# Patient Record
Sex: FEMALE | Race: WHITE
[De-identification: names, ages, dates, MRNs, and addresses within clinical notes are randomized per-mention and may not be internally consistent; named-entity substitution may affect disease eponyms.]

---

## 2017-06-05 ENCOUNTER — HOSPITAL ENCOUNTER (OUTPATIENT)
Dept: HOSPITAL 47 - RADMAMWWP | Age: 72
Discharge: HOME | End: 2017-06-05
Payer: MEDICARE

## 2017-06-05 DIAGNOSIS — Z12.31: Primary | ICD-10-CM

## 2017-06-05 DIAGNOSIS — Z80.3: ICD-10-CM

## 2017-06-05 PROCEDURE — 77063 BREAST TOMOSYNTHESIS BI: CPT

## 2017-06-05 NOTE — MM
Reason for exam: screening  (asymptomatic).

Last mammogram was performed 1 year ago.



History:

Patient is postmenopausal and is nulliparous.

Family history of premenopausal breast cancer in maternal aunt at age 39.

Benign left mammotome panel of the left breast, May 22, 2013.

Took estrogen for 10 years.



Physical Findings:

A clinical breast exam by your physician is recommended on an annual basis and 

results should be correlated with mammographic findings.



MG 3D Screening Mammo W/Cad

Bilateral CC and MLO view(s) were taken.

Prior study comparison: May 27, 2016, bilateral MG screening mammo w CAD.  May 26,

2015, bilateral MG screening mammo w CAD.  May 23, 2014, bilateral MG diagnostic 

mammo w CAD ILDA.

There are scattered fibroglandular densities.

Finding: There are typically benign round calcifications in both breasts. Previous

mammotome biopsy in the left breast. Asymmetric breast tissue left upper outer 

quadrant, stable. There is no discrete abnormality.





ASSESSMENT: Benign, BI-RAD 2



RECOMMENDATION:

Routine screening mammogram of both breasts in 1 year.

## 2018-05-20 ENCOUNTER — HOSPITAL ENCOUNTER (INPATIENT)
Dept: HOSPITAL 47 - EC | Age: 73
LOS: 3 days | Discharge: HOME | DRG: 247 | End: 2018-05-23
Payer: MEDICARE

## 2018-05-20 VITALS — BODY MASS INDEX: 37.1 KG/M2

## 2018-05-20 DIAGNOSIS — R00.1: ICD-10-CM

## 2018-05-20 DIAGNOSIS — I25.10: ICD-10-CM

## 2018-05-20 DIAGNOSIS — Z90.710: ICD-10-CM

## 2018-05-20 DIAGNOSIS — Z87.891: ICD-10-CM

## 2018-05-20 DIAGNOSIS — E78.5: ICD-10-CM

## 2018-05-20 DIAGNOSIS — I10: ICD-10-CM

## 2018-05-20 DIAGNOSIS — Z79.899: ICD-10-CM

## 2018-05-20 DIAGNOSIS — E66.9: ICD-10-CM

## 2018-05-20 DIAGNOSIS — I95.9: ICD-10-CM

## 2018-05-20 DIAGNOSIS — Z79.82: ICD-10-CM

## 2018-05-20 DIAGNOSIS — I21.19: Primary | ICD-10-CM

## 2018-05-20 DIAGNOSIS — I47.2: ICD-10-CM

## 2018-05-20 DIAGNOSIS — Z88.8: ICD-10-CM

## 2018-05-20 DIAGNOSIS — Z90.49: ICD-10-CM

## 2018-05-20 LAB
ALBUMIN SERPL-MCNC: 4.3 G/DL (ref 3.5–5)
ALP SERPL-CCNC: 75 U/L (ref 38–126)
ALT SERPL-CCNC: 29 U/L (ref 9–52)
ANION GAP SERPL CALC-SCNC: 15 MMOL/L
APTT BLD: 21.3 SEC (ref 22–30)
AST SERPL-CCNC: 23 U/L (ref 14–36)
BUN SERPL-SCNC: 22 MG/DL (ref 7–17)
CALCIUM SPEC-MCNC: 9.7 MG/DL (ref 8.4–10.2)
CHLORIDE SERPL-SCNC: 102 MMOL/L (ref 98–107)
CHOLEST SERPL-MCNC: 198 MG/DL (ref ?–200)
CK SERPL-CCNC: 213 U/L (ref 30–135)
CK SERPL-CCNC: 266 U/L (ref 30–135)
CK SERPL-CCNC: 68 U/L (ref 30–135)
CO2 SERPL-SCNC: 22 MMOL/L (ref 22–30)
ERYTHROCYTE [DISTWIDTH] IN BLOOD BY AUTOMATED COUNT: 4.67 M/UL (ref 3.8–5.4)
ERYTHROCYTE [DISTWIDTH] IN BLOOD: 12.4 % (ref 11.5–15.5)
GLUCOSE BLD-MCNC: 123 MG/DL (ref 75–99)
GLUCOSE SERPL-MCNC: 110 MG/DL (ref 74–99)
HCT VFR BLD AUTO: 41 % (ref 34–46)
HDLC SERPL-MCNC: 37 MG/DL (ref 40–60)
HGB BLD-MCNC: 14.3 GM/DL (ref 11.4–16)
INR PPP: 1 (ref ?–1.2)
LDLC SERPL CALC-MCNC: 114 MG/DL (ref 0–99)
LIPASE SERPL-CCNC: 204 U/L (ref 23–300)
MAGNESIUM SPEC-SCNC: 2.1 MG/DL (ref 1.6–2.3)
MCH RBC QN AUTO: 30.7 PG (ref 25–35)
MCHC RBC AUTO-ENTMCNC: 35 G/DL (ref 31–37)
MCV RBC AUTO: 87.6 FL (ref 80–100)
PLATELET # BLD AUTO: 247 K/UL (ref 150–450)
POTASSIUM SERPL-SCNC: 3.8 MMOL/L (ref 3.5–5.1)
PROT SERPL-MCNC: 7 G/DL (ref 6.3–8.2)
PT BLD: 10.2 SEC (ref 9–12)
SODIUM SERPL-SCNC: 139 MMOL/L (ref 137–145)
TRIGL SERPL-MCNC: 235 MG/DL (ref ?–150)
TROPONIN I SERPL-MCNC: 5.22 NG/ML (ref 0–0.03)
TROPONIN I SERPL-MCNC: <0.012 NG/ML (ref 0–0.03)
WBC # BLD AUTO: 12 K/UL (ref 3.8–10.6)

## 2018-05-20 PROCEDURE — 99291 CRITICAL CARE FIRST HOUR: CPT

## 2018-05-20 PROCEDURE — 85025 COMPLETE CBC W/AUTO DIFF WBC: CPT

## 2018-05-20 PROCEDURE — 027035Z DILATION OF CORONARY ARTERY, ONE ARTERY WITH TWO DRUG-ELUTING INTRALUMINAL DEVICES, PERCUTANEOUS APPROACH: ICD-10-PCS

## 2018-05-20 PROCEDURE — 85027 COMPLETE CBC AUTOMATED: CPT

## 2018-05-20 PROCEDURE — 82550 ASSAY OF CK (CPK): CPT

## 2018-05-20 PROCEDURE — 96375 TX/PRO/DX INJ NEW DRUG ADDON: CPT

## 2018-05-20 PROCEDURE — 85049 AUTOMATED PLATELET COUNT: CPT

## 2018-05-20 PROCEDURE — 82553 CREATINE MB FRACTION: CPT

## 2018-05-20 PROCEDURE — 83880 ASSAY OF NATRIURETIC PEPTIDE: CPT

## 2018-05-20 PROCEDURE — B2111ZZ FLUOROSCOPY OF MULTIPLE CORONARY ARTERIES USING LOW OSMOLAR CONTRAST: ICD-10-PCS

## 2018-05-20 PROCEDURE — 84484 ASSAY OF TROPONIN QUANT: CPT

## 2018-05-20 PROCEDURE — 96374 THER/PROPH/DIAG INJ IV PUSH: CPT

## 2018-05-20 PROCEDURE — 85610 PROTHROMBIN TIME: CPT

## 2018-05-20 PROCEDURE — 84100 ASSAY OF PHOSPHORUS: CPT

## 2018-05-20 PROCEDURE — 80061 LIPID PANEL: CPT

## 2018-05-20 PROCEDURE — 83690 ASSAY OF LIPASE: CPT

## 2018-05-20 PROCEDURE — 83735 ASSAY OF MAGNESIUM: CPT

## 2018-05-20 PROCEDURE — 83036 HEMOGLOBIN GLYCOSYLATED A1C: CPT

## 2018-05-20 PROCEDURE — 02C03ZZ EXTIRPATION OF MATTER FROM CORONARY ARTERY, ONE ARTERY, PERCUTANEOUS APPROACH: ICD-10-PCS

## 2018-05-20 PROCEDURE — 80053 COMPREHEN METABOLIC PANEL: CPT

## 2018-05-20 PROCEDURE — 93306 TTE W/DOPPLER COMPLETE: CPT

## 2018-05-20 PROCEDURE — 71045 X-RAY EXAM CHEST 1 VIEW: CPT

## 2018-05-20 PROCEDURE — 85730 THROMBOPLASTIN TIME PARTIAL: CPT

## 2018-05-20 PROCEDURE — 93005 ELECTROCARDIOGRAM TRACING: CPT

## 2018-05-20 PROCEDURE — 80048 BASIC METABOLIC PNL TOTAL CA: CPT

## 2018-05-20 PROCEDURE — 4A023N7 MEASUREMENT OF CARDIAC SAMPLING AND PRESSURE, LEFT HEART, PERCUTANEOUS APPROACH: ICD-10-PCS

## 2018-05-20 PROCEDURE — 93458 L HRT ARTERY/VENTRICLE ANGIO: CPT

## 2018-05-20 PROCEDURE — 36415 COLL VENOUS BLD VENIPUNCTURE: CPT

## 2018-05-20 RX ADMIN — FENTANYL CITRATE ONE MCG: 50 INJECTION, SOLUTION INTRAMUSCULAR; INTRAVENOUS at 11:54

## 2018-05-20 RX ADMIN — ATORVASTATIN CALCIUM SCH MG: 80 TABLET, FILM COATED ORAL at 20:48

## 2018-05-20 RX ADMIN — FENTANYL CITRATE ONE MCG: 50 INJECTION, SOLUTION INTRAMUSCULAR; INTRAVENOUS at 11:26

## 2018-05-20 NOTE — HP
HISTORY AND PHYSICAL



CHIEF COMPLAINT:

This is a 72-year-old white female, status post PTCA to right coronary artery.  She

came in with positive non ST MI.  She was having chest pain at home for 6 hours prior

to admission and doing kitchen work and washing floors.  The pain recurred.  She was

admitted and taken to the heart catheterization lab and 2 stents were placed in right

coronary artery.  Started on Plavix, Lipitor, aspirin, heparin, Zestril, Lopressor,

Ambien for sleep.  On admission her MBs tori to 2.7 up to 31.1 with a total CK of 213

with a CK index of 14.6.  BNP at 198 and a troponin up to 5.2.  Echocardiogram is

ordered.  Risk factor modifications were given with the patient.



CARDIOVASCULAR: S1, S2.  Lungs are clear.  GI is distended due to obesity and BMI over

40.

PSYCH:  Fair mood and affect.  NEUROLOGIC:  Alert and oriented x3.



His HDL is 37, LDL is 114, triglycerides 235, total cholesterol 198.  Hemoglobin A1c

14.3.



REVIEW OF SYSTEM:

Fourteen point review of systems negative except for mentioned in HPI.



ASSESSMENT:

1. Non ST myocardial infarction.

2. Hypertension.

3. Obesity.

4. Dyslipidemia, rule out diabetes.

Do a hemoglobin A1c.  Continue current medications as mentioned above.  Standard postop

myocardial infarction care.  Forty-five minutes.





MMODL / IJN: [id number] / Job#: [id number]

## 2018-05-20 NOTE — P.PCN
Date of Procedure: 05/20/18


Preoperative Diagnosis: 


Acute inferior wall MI


Postoperative Diagnosis: 


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


Procedure(s) Performed: 


Left heart catheterization


Description of Procedure: 


HISTORY: This is a 72-year-old female with history of hypertension who lives by 

herself, was brought to the emergency room with chest pain of one hour duration 

and EKG evidence of inferior wall MI.  Patient is advised to have a cardiac 

cath with the intention of primary intervention.





CONSENT:I have discussed the risks, benefits and alternative therapies for the 

above-mentioned procedure and for both sedation/analgesia as well as necessary 

blood product administration, if indicated, as they pertain to this patient.  

The patient has indicated understanding and acceptance of the risks and 

procedures discussed.








PROCEDURE: Patient was brought to the lab in a fasting state.  Patient was 

given some IV sedation.  The right groin is infiltrated with lidocaine and 

right femoral artery was entered using Seldinger technique.  A 6-[place]er 

catheter was left in place and selective coronary arteriography and left 

ventriculography was performed.  Patient tolerated the procedure well.  Femoral 

angiogram was performed and Angio-Seal was applied for hemostasis.  No 

immediate complications were noted and patient was transferred to [facility] in a 

stable condition





Conscious Sedation: Versed 1mg


Fentanyl :50 g


Duration: 10minutes   


Hemodynamics: The aortic pressure is 112/70.  Left ventricle end-diastolic 

pressure was not measured





SELECTIVE CORONARY ARTERIOGRAPHY: 


                          LEFT MAIN: Normal length and patent


                          THE LEFT ANTERIOR DESCENDING CORONARY ARTERY: 

Moderate caliber vessel free of any significant occlusive disease


                          THE LEFT CHEMODRCUMFLEX AND IS CORONARY ARTERY: Small 

nondominant vessel free of occlusive disease


                          THE RIGHT CORONARY ARTERY: Dominant vessel with a 

diffuse disease in the proximal portion with total occlusion in the midportion.

  There are collaterals from the left to the right.





LEFT VENTRICULOGRAPHY: Not performed





FINAL IMPRESSION: Total occlusion of the RCA with acute inferior wall MI





PLAN: Stent placement of the RCA to be done by Dr. Daily





PROGNOSIS: Guarded

## 2018-05-20 NOTE — P.CRDCN
History of Present Illness


Consult date: 05/20/18


History of present illness: 


This is a 72-year-old female with history of hypertension being treated with 

losartan, was brought by EMS to the emergency room with complaints of 

precordial chest pain which started about an hour ago prior to the admission.  

Patient is a sweaty and feeling weak.  Her EKG showed evidence of ST elevation 

in inferior leads.  Patient is also having episodes of triplets and brief 

episodes of nonsustained V. tach.  Patient is advised to have cardiac 

catheterization with the intention of primary intervention.  Patient doesn't 

have any immediate family.  Apparently she lives by herself.  She has no 

history of previous myocardial infarctions.  Her ischemic heart disease.  No 

history of diabetes.  No history of strokes








Review of Systems





Not obtained





Past Medical History


Past Medical History: Hyperlipidemia, Hypertension


History of Any Multi-Drug Resistant Organisms: None Reported


Past Surgical History: Adenoidectomy, Cholecystectomy, Hysterectomy, 

Tonsillectomy


Additional Past Surgical History / Comment(s): right shoulder repair, ruptured 

tendon right hand, carpal tunnel right hand, ganglion cysts, bladder suspension.


Past Psychological History: No Psychological Hx Reported


Smoking Status: Former smoker


Past Alcohol Use History: None Reported


Past Drug Use History: None Reported





Medications and Allergies


 Home Medications











 Medication  Instructions  Recorded  Confirmed  Type


 


Aspirin 81 mg PO DAILY 01/15/15 01/15/15 History


 


Azithromycin [Zithromax Z-pack] 250 mg PO AS DIRECTED #6 tab 01/15/15  Rx


 


Olmesartan/Hydrochlorothiazide 1 tab PO DAILY 01/15/15 01/15/15 History





[Benicar Hct 40-25 mg Tablet]    


 


Oseltamivir [Tamiflu] 75 mg PO Q12HR #10 cap 01/15/15  Rx


 


Vitamin B Complex 1 each PO DAILY 01/15/15 01/15/15 History











 Allergies











Allergy/AdvReac Type Severity Reaction Status Date / Time


 


dexamethasone Allergy  Anaphylaxis Verified 01/15/15 22:03














Physical Exam


Vitals: 


 Vital Signs











  Temp Pulse Resp BP Pulse Ox


 


 05/20/18 10:52   52 L  18  118/58  98


 


 05/20/18 10:45     123/58 


 


 05/20/18 10:34  97.0 F L  52 L  18   96








 Intake and Output











 05/19/18 05/20/18 05/20/18





 22:59 06:59 14:59


 


Other:   


 


  Weight   94.801 kg














GENERAL EXAM: Patient is alert and oriented and in moderate to severe distress.

  Occasional wheeze


HEENT: Normocephalic. Normal reaction of pupils, equal size, normal range of 

extraocular motion. No erythema or exudates in the throat.


NECK: No masses, no nuchal rigidity.


CHEST: No chest wall deformity.


LUNGS: Equal air entry with no crackles or wheeze.


HEART: S1 and S2 normal with no audible mumurs or gallops. Regular rhythm, 

femorals equal on both sides..


ABDOMEN: No hepatosplenomegaly, normal bowel sounds, no guarding or rigidity.


SKIN: No rashes


CENTRAL NERVOUS SYSTEM: No focal deficits.


EXTREMITIES: No cyanosis, clubbing or edema.





Results





 05/20/18 10:32





 CBC











  05/20/18 Range/Units





  10:32 


 


WBC  12.0 H  (3.8-10.6)  k/uL


 


RBC  4.67  (3.80-5.40)  m/uL


 


Hgb  14.3  (11.4-16.0)  gm/dL


 


Hct  41.0  (34.0-46.0)  %


 


Plt Count  247  (150-450)  k/uL








 Current Medications











Generic Name Dose Route Start Last Admin





  Trade Name Freq  PRN Reason Stop Dose Admin


 


Aspirin  325 mg  05/21/18 09:00  





  Aspirin  PO   





  DAILY [facility]   


 


Heparin Sodium (Porcine)  0 unit  05/20/18 10:35  





  Heparin  IV   





  Q6HR PRN   





  Low PTT   





  Protocol   


 


Heparin Sodium/Sodium Chloride  500 mls @ 0 mls/hr  05/20/18 10:45  





  25,000 unit/ Sodium Chloride  IV   





  .Q0M MONIK   





  Protocol   





  12 UNITS/KG/HR   


 


Sodium Chloride  1,000 mls @ 999 mls/hr  05/20/18 10:39  05/20/18 10:43





  Saline 0.9%  IV  05/20/18 11:39  999 mls/hr





  .Q1H1M ONE   Administration


 


Metoprolol Tartrate  50 mg  05/20/18 21:00  





  Lopressor  PO   





  BID MONIK   


 


Morphine Sulfate  2 mg  05/20/18 10:57  





  Morphine Sulfate (Inj)  IM  05/20/18 10:58  





  ONCE STA   


 


Nitroglycerin  0.4 mg  05/20/18 10:35  





  Nitrostat  SUBLINGUAL   





  Q5M PRN   





  Chest Pain   








 Intake and Output











 05/19/18 05/20/18 05/20/18





 22:59 06:59 14:59


 


Other:   


 


  Weight   94.801 kg








 Patient Weight











 05/21/18





 06:59


 


Weight 94.801 kg








 





 05/20/18 10:32 











EKG Interpretations (text)





Sinus rhythm with acute inferior wall MI





Assessment and Plan


(1) Acute MI, inferior wall


Current Visit: Yes   Status: Acute   Code(s): I21.19 - STEMI INVOLVING OTH 

CORONARY ARTERY OF INFERIOR WALL   SNOMED Code(s): 04465859


   





(2) Nonsustained ventricular tachycardia


Current Visit: Yes   Status: Acute   Code(s): I47.2 - VENTRICULAR TACHYCARDIA   

SNOMED Code(s): 022709105


   





(3) History of hypertension


Current Visit: Yes   Status: Acute   Code(s): Z86.79 - PERSONAL HISTORY OF 

OTHER DISEASES OF THE CIRCULATORY SYSTEM   SNOMED Code(s): 266813567


   


Plan: 


Proceed with cardiac catheterization with intervention of primary intervention.

  Dr. Daily is already informed who is the on-call interventionist.  Meanwhile, 

will continue with aspirin, Plavix and heparin.  Beta blockers were not given 

because of bradycardia.

## 2018-05-20 NOTE — XR
EXAMINATION TYPE: XR chest 1V portable

 

DATE OF EXAM: 5/20/2018

 

COMPARISON: Prior chest x-ray 1/15/2015

 

HISTORY: Chest pain, cardiac arrest

 

TECHNIQUE: Single frontal view of the chest is obtained.

 

FINDINGS:  There is no focal air space opacity, pleural effusion, or pneumothorax seen.  The cardiac 
silhouette size is stable. There are overlying cardiac leads.  The osseous structures are intact.

 

IMPRESSION:  No acute process.

## 2018-05-20 NOTE — ED
General Adult HPI





- General


Chief complaint: Chest Pain


Stated complaint: poss stemi


Time Seen by Provider: 05/20/18 10:32


Source: EMS, RN notes reviewed, old records reviewed


Mode of arrival: EMS


Limitations: no limitations





- History of Present Illness


Initial comments: 





This is a 72-year-old female the ER for evaluation.  Patient was essay for 

evaluation of severe sudden onset of chest pain.  Patient has history of high 

blood pressure cholesterol heart attack.   has severe left-sided chest 

pain heaviness, diaphoretic shortness of breath.  Patient is brought in by EMS, 

EMS states patient is complaining of severe chest pain, has been very 

diaphoretic throughout stay, no improvement in chest pain





- Related Data


 Home Medications











 Medication  Instructions  Recorded  Confirmed


 


Aspirin 81 mg PO DAILY 01/15/15 01/15/15


 


Olmesartan/Hydrochlorothiazide 1 tab PO DAILY 01/15/15 01/15/15





[Benicar Hct 40-25 mg Tablet]   


 


Vitamin B Complex 1 each PO DAILY 01/15/15 01/15/15








 Previous Rx's











 Medication  Instructions  Recorded


 


Azithromycin [Zithromax Z-pack] 250 mg PO AS DIRECTED #6 tab 01/15/15


 


Oseltamivir [Tamiflu] 75 mg PO Q12HR #10 cap 01/15/15











 Allergies











Allergy/AdvReac Type Severity Reaction Status Date / Time


 


dexamethasone Allergy  Anaphylaxis Verified 01/15/15 22:03














Review of Systems


ROS Statement: 


Those systems with pertinent positive or pertinent negative responses have been 

documented in the HPI.





ROS Other: All systems not noted in ROS Statement are negative.





Past Medical History


Past Medical History: Hyperlipidemia, Hypertension


History of Any Multi-Drug Resistant Organisms: None Reported


Past Surgical History: Adenoidectomy, Cholecystectomy, Hysterectomy, 

Tonsillectomy


Additional Past Surgical History / Comment(s): right shoulder repair, ruptured 

tendon right hand, carpal tunnel right hand, ganglion cysts, bladder suspension.


Past Psychological History: No Psychological Hx Reported


Smoking Status: Former smoker


Past Alcohol Use History: None Reported


Past Drug Use History: None Reported





General Exam





- General Exam Comments


Initial Comments: 





Diaphoretic


Limitations: no limitations


General appearance: alert, anxious, in distress


Head exam: Present: atraumatic, normocephalic, normal inspection


Eye exam: Present: normal appearance, PERRL, EOMI.  Absent: scleral icterus, 

conjunctival injection, periorbital swelling


ENT exam: Present: normal exam, mucous membranes moist


Neck exam: Present: normal inspection.  Absent: tenderness, meningismus, 

lymphadenopathy


Respiratory exam: Present: normal lung sounds bilaterally.  Absent: respiratory 

distress, wheezes, rales, rhonchi, stridor


Cardiovascular Exam: Present: regular rate, normal rhythm, normal heart sounds.

  Absent: systolic murmur, diastolic murmur, rubs, gallop, clicks


GI/Abdominal exam: Present: soft, normal bowel sounds.  Absent: distended, 

tenderness, guarding, rebound, rigid


Extremities exam: Present: normal inspection, full ROM, normal capillary 

refill.  Absent: tenderness, pedal edema, joint swelling, calf tenderness


Back exam: Present: normal inspection


Neurological exam: Present: alert, oriented X3, CN II-XII intact


Psychiatric exam: Present: normal affect, normal mood


Skin exam: Present: warm, dry, intact, normal color.  Absent: rash





Course





 Vital Signs











  05/20/18 05/20/18 05/20/18





  10:34 10:45 10:52


 


Temperature 97.0 F L  


 


Pulse Rate 52 L  52 L


 


Respiratory 18  18





Rate   


 


Blood Pressure  123/58 118/58


 


O2 Sat by Pulse 96  98





Oximetry   














- Reevaluation(s)


Reevaluation #1: 





05/20/18 10:40


STEMI is paged


Reevaluation #2: 





05/20/18 11:10


Cath Lab team and cardiologist in ER evaluating patient, taking patient to cath 

lab





Medical Decision Making





- Medical Decision Making





72 female the ER for evaluation, positive STEMI, Cath Lab was activated upon 

patient arrival.  Cardiology did evaluate patient here down in the emergency 

room the patient is transported to Cath Lab





- Lab Data


Result diagrams: 


 05/20/18 10:32





 05/20/18 10:32





 Lab Results











  05/20/18 05/20/18 Range/Units





  10:32 10:32 


 


WBC  12.0 H   (3.8-10.6)  k/uL


 


RBC  4.67   (3.80-5.40)  m/uL


 


Hgb  14.3   (11.4-16.0)  gm/dL


 


Hct  41.0   (34.0-46.0)  %


 


MCV  87.6   (80.0-100.0)  fL


 


MCH  30.7   (25.0-35.0)  pg


 


MCHC  35.0   (31.0-37.0)  g/dL


 


RDW  12.4   (11.5-15.5)  %


 


Plt Count  247   (150-450)  k/uL


 


Sodium   139  (137-145)  mmol/L


 


Potassium   3.8  (3.5-5.1)  mmol/L


 


Chloride   102  ()  mmol/L


 


Carbon Dioxide   22  (22-30)  mmol/L


 


Anion Gap   15  mmol/L


 


BUN   22 H  (7-17)  mg/dL


 


Creatinine   0.76  (0.52-1.04)  mg/dL


 


Est GFR (CKD-EPI)AfAm   >90  (>60 ml/min/1.73 sqM)  


 


Est GFR (CKD-EPI)NonAf   79  (>60 ml/min/1.73 sqM)  


 


Glucose   110 H  (74-99)  mg/dL


 


Calcium   9.7  (8.4-10.2)  mg/dL


 


Total Bilirubin   0.5  (0.2-1.3)  mg/dL


 


AST   23  (14-36)  U/L


 


ALT   29  (9-52)  U/L


 


Alkaline Phosphatase   75  ()  U/L


 


Total Protein   7.0  (6.3-8.2)  g/dL


 


Albumin   4.3  (3.5-5.0)  g/dL














- Radiology Data


Radiology results: report reviewed (Chest x-rays negative), image reviewed





Critical Care Time


Critical Care Time: Yes


Total Critical Care Time: 35





Disposition


Clinical Impression: 


 Acute MI, inferior wall, Nonsustained ventricular tachycardia





Disposition: ADMITTED AS IP TO THIS [facility]


Condition: Serious


Is patient prescribed a controlled substance at d/c from ED?: No

## 2018-05-21 LAB
ANION GAP SERPL CALC-SCNC: 10 MMOL/L
BUN SERPL-SCNC: 16 MG/DL (ref 7–17)
CALCIUM SPEC-MCNC: 9.1 MG/DL (ref 8.4–10.2)
CHLORIDE SERPL-SCNC: 100 MMOL/L (ref 98–107)
CHOLEST SERPL-MCNC: 157 MG/DL (ref ?–200)
CO2 SERPL-SCNC: 26 MMOL/L (ref 22–30)
ERYTHROCYTE [DISTWIDTH] IN BLOOD BY AUTOMATED COUNT: 4.32 M/UL (ref 3.8–5.4)
ERYTHROCYTE [DISTWIDTH] IN BLOOD: 12.6 % (ref 11.5–15.5)
GLUCOSE SERPL-MCNC: 92 MG/DL (ref 74–99)
HBA1C MFR BLD: 5.7 % (ref 4–6)
HCT VFR BLD AUTO: 38.9 % (ref 34–46)
HDLC SERPL-MCNC: 30 MG/DL (ref 40–60)
HGB BLD-MCNC: 13.1 GM/DL (ref 11.4–16)
LDLC SERPL CALC-MCNC: 93 MG/DL (ref 0–99)
MAGNESIUM SPEC-SCNC: 1.9 MG/DL (ref 1.6–2.3)
MCH RBC QN AUTO: 30.3 PG (ref 25–35)
MCHC RBC AUTO-ENTMCNC: 33.6 G/DL (ref 31–37)
MCV RBC AUTO: 90.1 FL (ref 80–100)
PLATELET # BLD AUTO: 190 K/UL (ref 150–450)
POTASSIUM SERPL-SCNC: 4 MMOL/L (ref 3.5–5.1)
SODIUM SERPL-SCNC: 136 MMOL/L (ref 137–145)
TRIGL SERPL-MCNC: 168 MG/DL (ref ?–150)
TROPONIN I SERPL-MCNC: 8.75 NG/ML (ref 0–0.03)
WBC # BLD AUTO: 12.9 K/UL (ref 3.8–10.6)

## 2018-05-21 RX ADMIN — ASPIRIN 325 MG ORAL TABLET SCH MG: 325 PILL ORAL at 07:54

## 2018-05-21 RX ADMIN — METOPROLOL TARTRATE SCH: 25 TABLET, FILM COATED ORAL at 11:45

## 2018-05-21 RX ADMIN — ATORVASTATIN CALCIUM SCH MG: 80 TABLET, FILM COATED ORAL at 22:04

## 2018-05-21 RX ADMIN — METOPROLOL TARTRATE SCH MG: 25 TABLET, FILM COATED ORAL at 10:27

## 2018-05-21 RX ADMIN — CLOPIDOGREL BISULFATE SCH MG: 75 TABLET ORAL at 07:54

## 2018-05-21 RX ADMIN — METOPROLOL TARTRATE SCH MG: 25 TABLET, FILM COATED ORAL at 22:04

## 2018-05-21 NOTE — P.PN
Subjective


Progress Note Date: 05/21/18


Principal diagnosis: 





STEMI involving the inferior wall





This 72-year-old female was admitted yesterday with chest pain and evidence of 

inferior wall microinfarction.  Patient had stent placement of the RCA.  She is 

feeling much better today.  No complaints of chest pain, shortness of breath or 

dizziness.  The groin is soft without any hematoma.  There is mild ecchymosis.  

Patient is in sinus rhythm.  Her troponin went up to 8.  Patient is on beta 

blocker, chloride depleted agents and ACE inhibitor.  Her heart rate and blood 

pressure low.  I'm going Back the dose of the metoprolol to 12.5 mg by mouth 

twice a day.  Patient could be transferred to telemetry unit and increase 

activity as tolerated.  She is going to have an echocardiogram done





Objective





- Vital Signs


Vital signs: 


 Vital Signs











Temp  98.1 F   05/21/18 04:00


 


Pulse  66   05/21/18 10:00


 


Resp  19   05/21/18 10:00


 


BP  112/55   05/21/18 09:00


 


Pulse Ox  96   05/21/18 10:00








 Intake & Output











 05/20/18 05/21/18 05/21/18





 18:59 06:59 18:59


 


Intake Total 1713.2 300 


 


Output Total  1150 


 


Balance 1713.2 -850 


 


Weight 94.801 kg 98.1 kg 98.1 kg


 


Intake:   


 


  IV 1213.2  


 


  Intake, IV Titration 500 300 





  Amount   


 


    Sodium Chloride 0.9% 1, 500 300 





    000 ml @ 100 mls/hr IV .   





    Q10H [facility] Rx#:[id number]   


 


Output:   


 


  Urine  1150 


 


Other:   


 


  Voiding Method Bedpan  Bedside Commode


 


  # Voids 1 0 0














- Exam





GENERAL EXAM: Patient is alert and oriented and doesn't appear to be in any 

acute distress


HEENT: Normocephalic. Normal reaction of pupils, equal size, normal range of 

extraocular motion. No erythema or exudates in the throat.


NECK: No masses, no nuchal rigidity.


CHEST: No chest wall deformity.


LUNGS: Equal air entry with no crackles or wheeze.


HEART: S1 and S2 normal with no audible mumurs or gallops. Regular rhythm, 

femorals equal on both sides..


ABDOMEN: No hepatosplenomegaly, normal bowel sounds, no guarding or rigidity.


SKIN: No rashes


CENTRAL NERVOUS SYSTEM: No focal deficits.


EXTREMITIES: No cyanosis, clubbing or edema.


RIGHT GROIN: Mild ecchymosis without any hematoma





- Labs


CBC & Chem 7: 


 05/21/18 03:56





 05/21/18 03:56


Labs: 


 Abnormal Lab Results - Last 24 Hours (Table)











  05/20/18 05/20/18 05/20/18 Range/Units





  10:32 10:32 10:32 


 


WBC  12.0 H    (3.8-10.6)  k/uL


 


APTT     (22.0-30.0)  sec


 


Sodium     (137-145)  mmol/L


 


BUN   22 H   (7-17)  mg/dL


 


Glucose   110 H   (74-99)  mg/dL


 


POC Glucose (mg/dL)     (75-99)  mg/dL


 


Total Creatine Kinase     ()  U/L


 


CK-MB (CK-2)    2.7 H*  (0.0-2.4)  ng/mL


 


Troponin I     (0.000-0.034)  ng/mL


 


Triglycerides     (<150)  mg/dL


 


LDL Cholesterol, Calc     (0-99)  mg/dL


 


HDL Cholesterol     (40-60)  mg/dL














  05/20/18 05/20/18 05/20/18 Range/Units





  10:32 13:09 16:45 


 


WBC     (3.8-10.6)  k/uL


 


APTT  21.3 L    (22.0-30.0)  sec


 


Sodium     (137-145)  mmol/L


 


BUN     (7-17)  mg/dL


 


Glucose     (74-99)  mg/dL


 


POC Glucose (mg/dL)   123 H   (75-99)  mg/dL


 


Total Creatine Kinase    213 H  ()  U/L


 


CK-MB (CK-2)    31.1 H*  (0.0-2.4)  ng/mL


 


Troponin I    5.220 H*  (0.000-0.034)  ng/mL


 


Triglycerides     (<150)  mg/dL


 


LDL Cholesterol, Calc     (0-99)  mg/dL


 


HDL Cholesterol     (40-60)  mg/dL














  05/20/18 05/20/18 05/21/18 Range/Units





  16:45 22:41 03:56 


 


WBC    12.9 H  (3.8-10.6)  k/uL


 


APTT     (22.0-30.0)  sec


 


Sodium     (137-145)  mmol/L


 


BUN     (7-17)  mg/dL


 


Glucose     (74-99)  mg/dL


 


POC Glucose (mg/dL)     (75-99)  mg/dL


 


Total Creatine Kinase   266 H   ()  U/L


 


CK-MB (CK-2)   42.4 H*   (0.0-2.4)  ng/mL


 


Troponin I   8.750 H*   (0.000-0.034)  ng/mL


 


Triglycerides  235 H    (<150)  mg/dL


 


LDL Cholesterol, Calc  114 H    (0-99)  mg/dL


 


HDL Cholesterol  37 L    (40-60)  mg/dL














  05/21/18 Range/Units





  03:56 


 


WBC   (3.8-10.6)  k/uL


 


APTT   (22.0-30.0)  sec


 


Sodium  136 L  (137-145)  mmol/L


 


BUN   (7-17)  mg/dL


 


Glucose   (74-99)  mg/dL


 


POC Glucose (mg/dL)   (75-99)  mg/dL


 


Total Creatine Kinase   ()  U/L


 


CK-MB (CK-2)   (0.0-2.4)  ng/mL


 


Troponin I   (0.000-0.034)  ng/mL


 


Triglycerides  168 H  (<150)  mg/dL


 


LDL Cholesterol, Calc   (0-99)  mg/dL


 


HDL Cholesterol  30 L  (40-60)  mg/dL














Assessment and Plan


(1) Acute MI, inferior wall


Current Visit: Yes   Status: Acute   Code(s): I21.19 - STEMI INVOLVING OTH 

CORONARY ARTERY OF INFERIOR WALL   SNOMED Code(s): 74126211


   





(2) Nonsustained ventricular tachycardia


Current Visit: Yes   Status: Acute   Code(s): I47.2 - VENTRICULAR TACHYCARDIA   

SNOMED Code(s): 733188522


   





(3) History of hypertension


Current Visit: Yes   Status: Acute   Code(s): Z86.79 - PERSONAL HISTORY OF 

OTHER DISEASES OF THE CIRCULATORY SYSTEM   SNOMED Code(s): 758414276


   


Plan: 


Patient is doing very well.  No arrhythmias.  Hemodynamically stable.  Being 

transferred to telemetry unit.  Increase activity as tolerated

## 2018-05-21 NOTE — PN
PROGRESS NOTE



SUBJECTIVE:

This is a 72-year-old white female, obese, status post PTCA x2 of the right coronary

artery.  Remains on Plavix, Lipitor, aspirin, Lopressor, Ambien and Nitrostat.  Risk

factor modification, including low HDL, discussed with the patient.  Saturation is 92%

on room air.  Temperature 98.8, pulse 68, respiratory rate 16-17.

CARDIOVASCULAR: S1, S2.

LUNGS: Transmitted upper airway sounds.

GI: Soft, nontender.

HEMATOLOGIC: Negative Homans.

PSYCH: Fair mood and affect.



ASSESSMENT:

1. ST-elevation myocardial infarction.

2. Coronary artery disease.

3. Dyslipidemia.

Continue Plavix, aspirin beta blockers, Lipitor.  Please see further orders.





MMODL / IJN: [id number] / Job#: [id number]

## 2018-05-21 NOTE — ECHOF
Referral Reason:post mi



MEASUREMENTS

--------

HEIGHT: 162.6 cm

WEIGHT: 98.0 kg

BP: 112/55

IVSd:   1.3 cm     (0.6 - 1.1)

LVIDd:   4.4 cm     (3.9 - 5.3)

LVPWd:   1.4 cm     (0.6 - 1.1)

IVSs:   2.2 cm

LVIDs:   2.2 cm

LVPWs:   1.6 cm

Ao Diam:   3.4 cm     (2.0 - 3.7)

AV Cusp:   2.3 cm     (1.5 - 2.6)

LA Diam:   3.2 cm     (2.7 - 3.8)

MV EXCURSION:   15.271 mm     (> 18.000)

MV EF SLOPE:   89 mm/s     (70 - 150)

EPSS:   0.9 cm

MV E Prasanna:   0.76 m/s

MV DecT:   280 ms

MV A Prasanna:   0.79 m/s

MV E/A Ratio:   0.95 

RAP:   5.00 mmHg

RVSP:   9.49 mmHg







FINDINGS

--------

Sinus rhythm.

This was a technically difficult study with suboptimal views.

The left ventricular size is normal.   There is mild concentric left ventricular hypertrophy.   Overa
ll left ventricular systolic function is low-normal with, an EF between 50 - 55 %.   Basal inferior L
V wall motion is hypokinetic.

The right ventricle is normal in size and function.

The left atrium is normal in size.

The right atrium is normal in size.

Lumason used

The aortic valve is trileaflet, and appears structurally normal. No aortic stenosis or regurgitation.


There is trace mitral regurgitation.

Trace tricuspid regurgitation present.   The right ventricular systolic pressure, as measured by Dopp
ler, is 9.49mmHg.

The pulmonic valve is normal.

The aortic root size is normal.

The pericardium is normal.



CONCLUSIONS

--------

1. Sinus rhythm.

2. This was a technically difficult study with suboptimal views.

3. The left ventricular size is normal.

4. There is mild concentric left ventricular hypertrophy.

5. Overall left ventricular systolic function is low-normal with, an EF between 50 - 55 %.

6. Basal inferior LV wall motion is hypokinetic.

7. The right ventricle is normal in size and function.

8. The left atrium is normal in size.

9. The right atrium is normal in size.

10. Lumason used

11. The aortic valve is trileaflet, and appears structurally normal. No aortic stenosis or regurgitat
ion.

12. There is trace mitral regurgitation.

13. Trace tricuspid regurgitation present.

14. The right ventricular systolic pressure, as measured by Doppler, is 9.49mmHg.

15. The pulmonic valve is normal.

16. The aortic root size is normal.

17. The pericardium is normal.





SONOGRAPHER: Ashanti Mai RDCS

## 2018-05-22 LAB
ANION GAP SERPL CALC-SCNC: 9 MMOL/L
BASOPHILS # BLD AUTO: 0 K/UL (ref 0–0.2)
BASOPHILS NFR BLD AUTO: 0 %
BUN SERPL-SCNC: 14 MG/DL (ref 7–17)
CALCIUM SPEC-MCNC: 9.2 MG/DL (ref 8.4–10.2)
CHLORIDE SERPL-SCNC: 99 MMOL/L (ref 98–107)
CO2 SERPL-SCNC: 28 MMOL/L (ref 22–30)
EOSINOPHIL # BLD AUTO: 0.4 K/UL (ref 0–0.7)
EOSINOPHIL NFR BLD AUTO: 4 %
ERYTHROCYTE [DISTWIDTH] IN BLOOD BY AUTOMATED COUNT: 4.19 M/UL (ref 3.8–5.4)
ERYTHROCYTE [DISTWIDTH] IN BLOOD: 12.6 % (ref 11.5–15.5)
GLUCOSE BLD-MCNC: 132 MG/DL (ref 75–99)
GLUCOSE SERPL-MCNC: 91 MG/DL (ref 74–99)
HCT VFR BLD AUTO: 37.1 % (ref 34–46)
HGB BLD-MCNC: 12.4 GM/DL (ref 11.4–16)
LYMPHOCYTES # SPEC AUTO: 3.1 K/UL (ref 1–4.8)
LYMPHOCYTES NFR SPEC AUTO: 36 %
MCH RBC QN AUTO: 29.5 PG (ref 25–35)
MCHC RBC AUTO-ENTMCNC: 33.3 G/DL (ref 31–37)
MCV RBC AUTO: 88.5 FL (ref 80–100)
MONOCYTES # BLD AUTO: 0.6 K/UL (ref 0–1)
MONOCYTES NFR BLD AUTO: 7 %
NEUTROPHILS # BLD AUTO: 4.3 K/UL (ref 1.3–7.7)
NEUTROPHILS NFR BLD AUTO: 51 %
PLATELET # BLD AUTO: 181 K/UL (ref 150–450)
POTASSIUM SERPL-SCNC: 3.8 MMOL/L (ref 3.5–5.1)
SODIUM SERPL-SCNC: 136 MMOL/L (ref 137–145)
WBC # BLD AUTO: 8.5 K/UL (ref 3.8–10.6)

## 2018-05-22 RX ADMIN — METOPROLOL TARTRATE SCH: 25 TABLET, FILM COATED ORAL at 08:50

## 2018-05-22 RX ADMIN — METOPROLOL TARTRATE SCH MG: 25 TABLET, FILM COATED ORAL at 20:12

## 2018-05-22 RX ADMIN — ASPIRIN 325 MG ORAL TABLET SCH MG: 325 PILL ORAL at 08:53

## 2018-05-22 RX ADMIN — CLOPIDOGREL BISULFATE SCH MG: 75 TABLET ORAL at 08:53

## 2018-05-22 RX ADMIN — ATORVASTATIN CALCIUM SCH MG: 80 TABLET, FILM COATED ORAL at 20:12

## 2018-05-22 NOTE — P.PN
Subjective


Progress Note Date: 05/22/18


Principal diagnosis: 





STEMI involving the inferior wall





This 72-year-old female was admitted yesterday with chest pain and evidence of 

inferior wall microinfarction.  Patient had stent placement of the RCA.  She is 

feeling much better today.  No complaints of chest pain, shortness of breath or 

dizziness.  The groin is soft without any hematoma.  There is mild ecchymosis.  

Patient is in sinus rhythm.  Her troponin went up to 8.  Patient is on beta 

blocker, chloride depleted agents and ACE inhibitor.  Her heart rate and blood 

pressure low.  I'm going Back the dose of the metoprolol to 12.5 mg by mouth 

twice a day.  Patient could be transferred to telemetry unit and increase 

activity as tolerated.  She is going to have an echocardiogram done.





05/22/2018: Patient is clinically stable.  Patient is running low blood 

pressures.  I'm going to stop the lisinopril.  I'm going to increase the fluid 

intake and increase her activity as tolerated.  Patient is maintaining sinus 

rhythm.  Echo Cardigan showed an ejection fraction of 50-55%.  No significant 

valvular abnormalities noted.  Right ventricular systolic blood pressure is on 

the low side.  He patient remains stable, patient will be discharged home 

tomorrow.  Patient to be moved out of ICU to telemetry unit





Objective





- Vital Signs


Vital signs: 


 Vital Signs











Temp  97.9 F   05/22/18 08:00


 


Pulse  71   05/22/18 08:00


 


Resp  18   05/22/18 08:00


 


BP  86/50   05/22/18 08:00


 


Pulse Ox  95   05/22/18 08:00








 Intake & Output











 05/21/18 05/22/18 05/22/18





 18:59 06:59 18:59


 


Intake Total  480 


 


Output Total 350 850 


 


Balance -350 -370 


 


Weight 98.1 kg 98.5 kg 


 


Intake:   


 


  Oral  480 


 


Output:   


 


  Urine 350 850 


 


Other:   


 


  Voiding Method Bedside Commode Bedside Commode 


 


  # Voids 1  














- Exam





GENERAL EXAM: Patient is alert and oriented and doesn't appear to be in any 

acute distress


HEENT: Normocephalic. Normal reaction of pupils, equal size, normal range of 

extraocular motion. No erythema or exudates in the throat.


NECK: No masses, no nuchal rigidity.


CHEST: No chest wall deformity.


LUNGS: Equal air entry with no crackles or wheeze.


HEART: S1 and S2 normal with no audible mumurs or gallops. Regular rhythm, 

femorals equal on both sides..


ABDOMEN: No hepatosplenomegaly, normal bowel sounds, no guarding or rigidity.


SKIN: No rashes


CENTRAL NERVOUS SYSTEM: No focal deficits.


EXTREMITIES: No cyanosis, clubbing or edema.


RIGHT GROIN: Mild ecchymosis without any hematoma





- Labs


CBC & Chem 7: 


 05/22/18 04:23





 05/22/18 04:23


Labs: 


 Abnormal Lab Results - Last 24 Hours (Table)











  05/22/18 Range/Units





  04:23 


 


Sodium  136 L  (137-145)  mmol/L














Assessment and Plan


(1) Acute MI, inferior wall


Current Visit: Yes   Status: Acute   Code(s): I21.19 - STEMI INVOLVING OTH 

CORONARY ARTERY OF INFERIOR WALL   SNOMED Code(s): 88042594


   





(2) Nonsustained ventricular tachycardia


Current Visit: Yes   Status: Acute   Code(s): I47.2 - VENTRICULAR TACHYCARDIA   

SNOMED Code(s): 132041069


   





(3) History of hypertension


Current Visit: Yes   Status: Acute   Code(s): Z86.79 - PERSONAL HISTORY OF 

OTHER DISEASES OF THE CIRCULATORY SYSTEM   SNOMED Code(s): 465575645


   


Plan: 


Patient is clinically stable except low blood pressures.  I'm going discontinue 

the lisinopril.  Increase activity.  If stable, possible discharge in a.m.

## 2018-05-22 NOTE — PN
PROGRESS NOTE



SUBJECTIVE:

72-year-old white female seen by Cardiology, status post PTCA of the right coronary

arteries x2.  Risk factor modifications were reviewed.



Cardiovascular S1-S2.  Lungs clear.  GI soft.  Endocrine:  BMI is over 40.



ASSESSMENT AND PLAN:

1. ST elevation myocardial infarction involving inferior wall PTCA right coronary

    artery.

2. She was held for discharge due to hypotension today.

3. The patient on beta blocker, ACE inhibitor.

4. Blood pressure was low.  She was _____ the metoprolol 12.5 b.i.d.

5. Possible discharge home tomorrow when clinically stable.





MMODL / IJN: [id number] / Job#: [id number]

## 2018-05-22 NOTE — PTCA
PERCUTANEOUSTRANS CORORONARY ANGIOGRAPHY



DATE OF SERVICE:

May 20, 2018



PERFORMING PHYSICIAN:

Jamin Mckeon MD, interventional cardiologist.



PROCEDURE PERFORMED:

1. Aspiration thrombectomy from the RCA.

2. Successful stenting of the distal RCA using 3.25 x 38 mm Xience ALL with good

    angiographic results.

3. Successful stenting of the mid RCA using 3.5 x 6 x 18 mm Xience ALL with good

    angiographic results.



INDICATION:

This is a pleasant 72-year-old female patient who presented to the hospital with chest

discomfort and was diagnosed with acute inferior ST-elevation myocardial infarction.

She underwent an emergent heart catheterization by Dr. Ugalde and was found to

have an acute total occlusion of the distal RCA.



APPROACH:

Right common femoral artery.



COMPLICATION:

None.



LEVEL OF SEDATION:

Moderate with sedation length 54 minutes.



_____ balloon was 77 minutes.



PROCEDURE DESCRIPTION:

After diagnostic heart catheterization was performed by Dr. Ugalde, and reviewing

the angiogram we decided to pursue with intervention on the RCA.  Anticoagulation was

initiated using Angiomax.  The RCA was engaged using JR4 guide.  A whisper wire was

used to cross the acute total occlusion in the distal portion.  I did after that

aspiration thrombectomy using an export catheter and I was able to extract multiple

plaque clots from the RCA.  After that I did balloon angioplasty using initially 2 5

and then 3 0 balloon.  After that I did stenting of the distal RCA using a 3.25 x 38 mm

Xience ALL where the stent was positioned under fluoroscopy guidance and deployed under

14 atmospheres for 20 seconds.  For the mid RCA, I deployed a 3.5 x 18 mm Xience ALL

where the stent again was positioned under 14 atmospheres for 20 seconds.  The

following angiogram showed excellent angiographic results and the procedure was

completed without any complication.



CONCLUSION:

1. Acute inferior ST-elevation myocardial infarction.

2. 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

3. Successful stenting of the distal and mid RCA as described above.



POSTPROCEDURE MANAGEMENT:

1. Dual anti-platelet therapy.

2. Risk factor modifications.

3. Follow up with the patient.





MMODL / IJN: [id number] / Job#: [id number]

## 2018-05-23 VITALS — RESPIRATION RATE: 20 BRPM | SYSTOLIC BLOOD PRESSURE: 112 MMHG | DIASTOLIC BLOOD PRESSURE: 65 MMHG | HEART RATE: 89 BPM

## 2018-05-23 VITALS — TEMPERATURE: 97.9 F

## 2018-05-23 LAB
ANION GAP SERPL CALC-SCNC: 12 MMOL/L
BASOPHILS # BLD AUTO: 0 K/UL (ref 0–0.2)
BASOPHILS NFR BLD AUTO: 1 %
BUN SERPL-SCNC: 15 MG/DL (ref 7–17)
CALCIUM SPEC-MCNC: 9.3 MG/DL (ref 8.4–10.2)
CHLORIDE SERPL-SCNC: 103 MMOL/L (ref 98–107)
CO2 SERPL-SCNC: 27 MMOL/L (ref 22–30)
EOSINOPHIL # BLD AUTO: 0.5 K/UL (ref 0–0.7)
EOSINOPHIL NFR BLD AUTO: 5 %
ERYTHROCYTE [DISTWIDTH] IN BLOOD BY AUTOMATED COUNT: 4.29 M/UL (ref 3.8–5.4)
ERYTHROCYTE [DISTWIDTH] IN BLOOD: 12.9 % (ref 11.5–15.5)
GLUCOSE SERPL-MCNC: 102 MG/DL (ref 74–99)
HCT VFR BLD AUTO: 37.9 % (ref 34–46)
HGB BLD-MCNC: 13 GM/DL (ref 11.4–16)
LYMPHOCYTES # SPEC AUTO: 3.5 K/UL (ref 1–4.8)
LYMPHOCYTES NFR SPEC AUTO: 38 %
MCH RBC QN AUTO: 30.4 PG (ref 25–35)
MCHC RBC AUTO-ENTMCNC: 34.4 G/DL (ref 31–37)
MCV RBC AUTO: 88.5 FL (ref 80–100)
MONOCYTES # BLD AUTO: 0.6 K/UL (ref 0–1)
MONOCYTES NFR BLD AUTO: 7 %
NEUTROPHILS # BLD AUTO: 4.5 K/UL (ref 1.3–7.7)
NEUTROPHILS NFR BLD AUTO: 48 %
PLATELET # BLD AUTO: 188 K/UL (ref 150–450)
POTASSIUM SERPL-SCNC: 3.9 MMOL/L (ref 3.5–5.1)
SODIUM SERPL-SCNC: 142 MMOL/L (ref 137–145)
WBC # BLD AUTO: 9.3 K/UL (ref 3.8–10.6)

## 2018-05-23 RX ADMIN — ASPIRIN 325 MG ORAL TABLET SCH MG: 325 PILL ORAL at 08:20

## 2018-05-23 RX ADMIN — CLOPIDOGREL BISULFATE SCH MG: 75 TABLET ORAL at 08:20

## 2018-05-23 RX ADMIN — METOPROLOL TARTRATE SCH MG: 25 TABLET, FILM COATED ORAL at 08:20

## 2018-05-23 NOTE — P.PN
Subjective


Progress Note Date: 05/23/18


Principal diagnosis: 





STEMI involving the inferior wall





This 72-year-old female was admitted yesterday with chest pain and evidence of 

inferior wall microinfarction.  Patient had stent placement of the RCA.  She is 

feeling much better today.  No complaints of chest pain, shortness of breath or 

dizziness.  The groin is soft without any hematoma.  There is mild ecchymosis.  

Patient is in sinus rhythm.  Her troponin went up to 8.  Patient is on beta 

blocker, chloride depleted agents and ACE inhibitor.  Her heart rate and blood 

pressure low.  I'm going Back the dose of the metoprolol to 12.5 mg by mouth 

twice a day.  Patient could be transferred to telemetry unit and increase 

activity as tolerated.  She is going to have an echocardiogram done.





05/22/2018: Patient is clinically stable.  Patient is running low blood 

pressures.  I'm going to stop the lisinopril.  I'm going to increase the fluid 

intake and increase her activity as tolerated.  Patient is maintaining sinus 

rhythm.  Echo Cardigan showed an ejection fraction of 50-55%.  No significant 

valvular abnormalities noted.  Right ventricular systolic blood pressure is on 

the low side.  He patient remains stable, patient will be discharged home 

tomorrow.  Patient to be moved out of ICU to telemetry unit.





05/23/2018: This patient is feeling better.  Tolerating activity.  No 

complaints of chest pain, shortness breath or palpitations.  Blood pressure is 

stable in the range of 110 systolic.  Patient is being discharged home.  She'll 

continue aspirin, Plavix, beta blocker and lipid-lowering agent.  Follow-up in 

the office in one week.  Patient advised not to lift any weights.  Patient 

could do light work and walking.  





Objective





- Vital Signs


Vital signs: 


 Vital Signs











Temp  97.9 F   05/23/18 10:00


 


Pulse  89   05/23/18 14:00


 


Resp  20   05/23/18 12:00


 


BP  112/65   05/23/18 12:00


 


Pulse Ox  98   05/23/18 12:00








 Intake & Output











 05/22/18 05/23/18 05/23/18





 18:59 06:59 18:59


 


Intake Total 480 240 360


 


Output Total 400  400


 


Balance 80 240 -40


 


Weight  95.6 kg 95.6 kg


 


Intake:   


 


  Oral 480 240 360


 


Output:   


 


  Urine 400  400


 


Other:   


 


  Voiding Method Toilet Toilet Toilet





 Bedside Commode Bedside Commode Bedside Commode


 


  # Voids 3 4 1


 


  # Bowel Movements 1 1 














- Exam





GENERAL EXAM: Patient is alert and oriented and doesn't appear to be in any 

acute distress


HEENT: Normocephalic. Normal reaction of pupils, equal size, normal range of 

extraocular motion. No erythema or exudates in the throat.


NECK: No masses, no nuchal rigidity.


CHEST: No chest wall deformity.


LUNGS: Equal air entry with no crackles or wheeze.


HEART: S1 and S2 normal with no audible mumurs or gallops. Regular rhythm, 

femorals equal on both sides..


ABDOMEN: No hepatosplenomegaly, normal bowel sounds, no guarding or rigidity.


SKIN: No rashes


CENTRAL NERVOUS SYSTEM: No focal deficits.


EXTREMITIES: No cyanosis, clubbing or edema.


RIGHT GROIN: Mild ecchymosis without any hematoma





- Labs


CBC & Chem 7: 


 05/23/18 04:00





 05/23/18 04:38


Labs: 


 Abnormal Lab Results - Last 24 Hours (Table)











  05/22/18 05/23/18 Range/Units





  20:07 04:38 


 


Glucose   102 H  (74-99)  mg/dL


 


POC Glucose (mg/dL)  132 H   (75-99)  mg/dL














Assessment and Plan


(1) Acute MI, inferior wall


Status: Acute   Code(s): I21.19 - STEMI INVOLVING OTH CORONARY ARTERY OF 

INFERIOR WALL   SNOMED Code(s): 53229779


   





(2) Nonsustained ventricular tachycardia


Status: Acute   Code(s): I47.2 - VENTRICULAR TACHYCARDIA   SNOMED Code(s): 

612640727


   





(3) History of hypertension


Status: Acute   Code(s): Z86.79 - PERSONAL HISTORY OF OTHER DISEASES OF THE 

CIRCULATORY SYSTEM   SNOMED Code(s): 087883698


   


Plan: 


Patient is critically stable, being discharged home.  Follow-up in the office 

in one week

## 2018-06-03 ENCOUNTER — HOSPITAL ENCOUNTER (EMERGENCY)
Dept: HOSPITAL 47 - EC | Age: 73
Discharge: HOME | End: 2018-06-03
Payer: MEDICARE

## 2018-06-03 VITALS
RESPIRATION RATE: 16 BRPM | SYSTOLIC BLOOD PRESSURE: 134 MMHG | DIASTOLIC BLOOD PRESSURE: 63 MMHG | HEART RATE: 72 BPM | TEMPERATURE: 97.9 F

## 2018-06-03 DIAGNOSIS — N39.0: Primary | ICD-10-CM

## 2018-06-03 DIAGNOSIS — Z88.8: ICD-10-CM

## 2018-06-03 DIAGNOSIS — Z53.8: ICD-10-CM

## 2018-06-03 DIAGNOSIS — Z95.5: ICD-10-CM

## 2018-06-03 DIAGNOSIS — Z79.899: ICD-10-CM

## 2018-06-03 DIAGNOSIS — I10: ICD-10-CM

## 2018-06-03 DIAGNOSIS — Z87.891: ICD-10-CM

## 2018-06-03 DIAGNOSIS — R19.7: ICD-10-CM

## 2018-06-03 LAB
ALBUMIN SERPL-MCNC: 3.9 G/DL (ref 3.5–5)
ALP SERPL-CCNC: 74 U/L (ref 38–126)
ALT SERPL-CCNC: 29 U/L (ref 9–52)
ANION GAP SERPL CALC-SCNC: 14 MMOL/L
APTT BLD: 23.9 SEC (ref 22–30)
AST SERPL-CCNC: 24 U/L (ref 14–36)
BASOPHILS # BLD AUTO: 0 K/UL (ref 0–0.2)
BASOPHILS NFR BLD AUTO: 0 %
BUN SERPL-SCNC: 17 MG/DL (ref 7–17)
CALCIUM SPEC-MCNC: 8.9 MG/DL (ref 8.4–10.2)
CHLORIDE SERPL-SCNC: 101 MMOL/L (ref 98–107)
CK SERPL-CCNC: 29 U/L (ref 30–135)
CO2 SERPL-SCNC: 26 MMOL/L (ref 22–30)
EOSINOPHIL # BLD AUTO: 0.1 K/UL (ref 0–0.7)
EOSINOPHIL NFR BLD AUTO: 1 %
ERYTHROCYTE [DISTWIDTH] IN BLOOD BY AUTOMATED COUNT: 4.55 M/UL (ref 3.8–5.4)
ERYTHROCYTE [DISTWIDTH] IN BLOOD: 13 % (ref 11.5–15.5)
GLUCOSE BLD-MCNC: 115 MG/DL (ref 75–99)
GLUCOSE SERPL-MCNC: 104 MG/DL (ref 74–99)
HCT VFR BLD AUTO: 39.8 % (ref 34–46)
HGB BLD-MCNC: 13.8 GM/DL (ref 11.4–16)
INR PPP: 1.1 (ref ?–1.2)
LYMPHOCYTES # SPEC AUTO: 1.8 K/UL (ref 1–4.8)
LYMPHOCYTES NFR SPEC AUTO: 16 %
MAGNESIUM SPEC-SCNC: 1.8 MG/DL (ref 1.6–2.3)
MCH RBC QN AUTO: 30.2 PG (ref 25–35)
MCHC RBC AUTO-ENTMCNC: 34.6 G/DL (ref 31–37)
MCV RBC AUTO: 87.4 FL (ref 80–100)
MONOCYTES # BLD AUTO: 0.7 K/UL (ref 0–1)
MONOCYTES NFR BLD AUTO: 6 %
NEUTROPHILS # BLD AUTO: 8.1 K/UL (ref 1.3–7.7)
NEUTROPHILS NFR BLD AUTO: 74 %
PH UR: 6 [PH] (ref 5–8)
PLATELET # BLD AUTO: 226 K/UL (ref 150–450)
POTASSIUM SERPL-SCNC: 4.3 MMOL/L (ref 3.5–5.1)
PROT SERPL-MCNC: 6.7 G/DL (ref 6.3–8.2)
PT BLD: 10.5 SEC (ref 9–12)
RBC UR QL: 7 /HPF (ref 0–5)
SODIUM SERPL-SCNC: 141 MMOL/L (ref 137–145)
SP GR UR: 1.01 (ref 1–1.03)
SQUAMOUS UR QL AUTO: 3 /HPF (ref 0–4)
TROPONIN I SERPL-MCNC: 0.02 NG/ML (ref 0–0.03)
UROBILINOGEN UR QL STRIP: <2 MG/DL (ref ?–2)
WBC # BLD AUTO: 10.8 K/UL (ref 3.8–10.6)
WBC #/AREA URNS HPF: 68 /HPF (ref 0–5)

## 2018-06-03 PROCEDURE — 93005 ELECTROCARDIOGRAM TRACING: CPT

## 2018-06-03 PROCEDURE — 82553 CREATINE MB FRACTION: CPT

## 2018-06-03 PROCEDURE — 85610 PROTHROMBIN TIME: CPT

## 2018-06-03 PROCEDURE — 82272 OCCULT BLD FECES 1-3 TESTS: CPT

## 2018-06-03 PROCEDURE — 96374 THER/PROPH/DIAG INJ IV PUSH: CPT

## 2018-06-03 PROCEDURE — 83735 ASSAY OF MAGNESIUM: CPT

## 2018-06-03 PROCEDURE — 81001 URINALYSIS AUTO W/SCOPE: CPT

## 2018-06-03 PROCEDURE — 82550 ASSAY OF CK (CPK): CPT

## 2018-06-03 PROCEDURE — 84484 ASSAY OF TROPONIN QUANT: CPT

## 2018-06-03 PROCEDURE — 80053 COMPREHEN METABOLIC PANEL: CPT

## 2018-06-03 PROCEDURE — 71046 X-RAY EXAM CHEST 2 VIEWS: CPT

## 2018-06-03 PROCEDURE — 83605 ASSAY OF LACTIC ACID: CPT

## 2018-06-03 PROCEDURE — 99285 EMERGENCY DEPT VISIT HI MDM: CPT

## 2018-06-03 PROCEDURE — 85025 COMPLETE CBC W/AUTO DIFF WBC: CPT

## 2018-06-03 PROCEDURE — 36415 COLL VENOUS BLD VENIPUNCTURE: CPT

## 2018-06-03 PROCEDURE — 85730 THROMBOPLASTIN TIME PARTIAL: CPT

## 2018-06-03 NOTE — XR
EXAMINATION TYPE: XR chest 2V

 

DATE OF EXAM: 6/3/2018

 

COMPARISON: Chest x-ray May 20, 2018

 

HISTORY: General weakness. History of myocardial infarction 2 weeks ago.

 

TECHNIQUE:  Frontal and lateral views of the chest are obtained.

 

FINDINGS:  There is no focal air space opacity, pleural effusion, or pneumothorax seen.  The cardiac 
silhouette size is upper limits of normal on current study with slightly ectatic thoracic aorta.   Th
e osseous structures are intact.

 

IMPRESSION:  No acute process identified currently.

## 2018-06-03 NOTE — ED
General Adult HPI





- General


Chief complaint: Weakness


Stated complaint: Feels Weak


Time Seen by Provider: 06/03/18 07:00


Source: patient, RN notes reviewed


Mode of arrival: wheelchair


Limitations: no limitations





- History of Present Illness


Initial comments: 





This is a 73-year-old female with past medical history for a MI approximately 2 

weeks ago.  Patient states she's on Plavix.  Patient states the last 3 days she 

has been having diarrhea nonstop.  Patient states she was up all last night 

having diarrhea.  Patient states at one point the diarrhea seems very dark and 

so she decided come the emergency department.  Patient denies any chest pain 

palpitations difficulty breathing or shortness of breath.  Patient describes 

the weakness is generalized weakness for focal weakness or numbness.  Patient 

denies any lightheadedness or dizziness patient denies headache patient denies 

any abdominal pain.  Patient states she is occasionally nauseated but she 

thinks is from not eating.  Patient denies any dysuria hematuria urinary 

frequency.





- Related Data


 Home Medications











 Medication  Instructions  Recorded  Confirmed


 


Vitamin B Complex 1 cap PO DAILY 01/15/15 06/03/18


 


Losartan/Hydrochlorothiazide 1 tab PO DAILY 05/20/18 06/03/18





[Losartan-Hctz 100-25 mg Tab]   








 Previous Rx's











 Medication  Instructions  Recorded


 


Aspirin 325 mg PO DAILY #0 tab 05/23/18


 


Atorvastatin [Lipitor] 80 mg PO HS #0 tab 05/23/18


 


Clopidogrel [Plavix] 75 mg PO DAILY #0 tab 05/23/18


 


Metoprolol Tartrate [Lopressor] 12.5 mg PO BID #0 tab 05/23/18


 


Nitroglycerin Sl Tabs [Nitrostat] 0.4 mg SUBLINGUAL Q5M PRN #0 tab 05/23/18


 


Sulfamethox-Tmp 800-160Mg [Bactrim 1 each PO Q12HR #14 tab 06/03/18





-160 mg]  











 Allergies











Allergy/AdvReac Type Severity Reaction Status Date / Time


 


dexamethasone Allergy  Anaphylaxis Verified 05/20/18 15:56














Review of Systems


ROS Statement: 


Those systems with pertinent positive or pertinent negative responses have been 

documented in the HPI.





ROS Other: All systems not noted in ROS Statement are negative.





Past Medical History


Past Medical History: Hyperlipidemia, Hypertension, Myocardial Infarction (MI)


History of Any Multi-Drug Resistant Organisms: None Reported


Past Surgical History: Adenoidectomy, Cholecystectomy, Heart Catheterization 

With Stent, Hysterectomy, Tonsillectomy


Additional Past Surgical History / Comment(s): right shoulder repair, ruptured 

tendon right hand, carpal tunnel right hand, ganglion cysts, bladder suspension.


Past Psychological History: No Psychological Hx Reported


Smoking Status: Former smoker


Past Alcohol Use History: None Reported


Past Drug Use History: None Reported





General Exam





- General Exam Comments


Initial Comments: 





GENERAL:


Patient is well-developed and well-nourished.  Patient is nontoxic and well-

hydrated and is in mild distress.





ENT:


Neck is soft and supple.  No significant lymphadenopathy is noted.  Oropharynx 

is clear.  Moist mucous membranes.  Neck has full range of motion without 

eliciting any pain.  





EYES:


The sclera were anicteric and conjunctiva were pink and moist.  Extraocular 

movements were intact and pupils were equal round and reactive to light.  

Eyelids were unremarkable.





PULMONARY:


Unlabored respirations.  Good breath sounds bilaterally.  No audible rales 

rhonchi or wheezing was noted.





CARDIOVASCULAR:


There is a regular rate and rhythm without any murmurs gallops or rubs. 





ABDOMEN:


Soft and nontender with normal bowel sounds.  





SKIN:


Skin is clear with no lesions or rashes and otherwise unremarkable.





NEUROLOGIC:


Patient is alert and oriented x3.  Cranial nerves II through XII are grossly 

intact.  Motor and sensory are also intact.  Normal speech, volume and content.

  Symmetrical smile.  





MUSCULOSKELETAL:


Normal extremities with adequate strength and full range of motion. 





LYMPHATICS:


No significant lymphadenopathy is noted





PSYCHIATRIC:


Normal psychiatric evaluation.  Normal interpersonal interactions appears 

functionally intact in deals appropriately with others.  No signs of 

depression.  No signs of anxiety. 


Limitations: no limitations





Course


 Vital Signs











  06/03/18 06/03/18 06/03/18





  06:51 07:53 08:21


 


Temperature 97.4 F L  


 


Pulse Rate 81 90 69


 


Respiratory 20 18 18





Rate   


 


Blood Pressure 137/68 149/66 123/59


 


O2 Sat by Pulse 97 100 99





Oximetry   














Medical Decision Making





- Medical Decision Making





EKG shows normal sinus rhythm at 74 bpm W[place] interval is 164 QRS is 88 QT 

interval 382 QTC is 424.  Patient's EKG shows some inverted T waves and Q waves 

in leads II, III, and F aVF.  I compared this to an old EKG there are no new 

changes.





Patient received Rocephin for the urinary tract infection.





Patient received Lomotil for diarrhea.





- Lab Data


Result diagrams: 


 06/03/18 07:05





 06/03/18 07:05


 Lab Results











  06/03/18 06/03/18 06/03/18 Range/Units





  07:05 07:05 07:05 


 


WBC   10.8 H   (3.8-10.6)  k/uL


 


RBC   4.55   (3.80-5.40)  m/uL


 


Hgb   13.8   (11.4-16.0)  gm/dL


 


Hct   39.8   (34.0-46.0)  %


 


MCV   87.4   (80.0-100.0)  fL


 


MCH   30.2   (25.0-35.0)  pg


 


MCHC   34.6   (31.0-37.0)  g/dL


 


RDW   13.0   (11.5-15.5)  %


 


Plt Count   226   (150-450)  k/uL


 


Neutrophils %   74   %


 


Lymphocytes %   16   %


 


Monocytes %   6   %


 


Eosinophils %   1   %


 


Basophils %   0   %


 


Neutrophils #   8.1 H   (1.3-7.7)  k/uL


 


Lymphocytes #   1.8   (1.0-4.8)  k/uL


 


Monocytes #   0.7   (0-1.0)  k/uL


 


Eosinophils #   0.1   (0-0.7)  k/uL


 


Basophils #   0.0   (0-0.2)  k/uL


 


PT     (9.0-12.0)  sec


 


INR     (<1.2)  


 


APTT     (22.0-30.0)  sec


 


Sodium    141  (137-145)  mmol/L


 


Potassium    4.3  (3.5-5.1)  mmol/L


 


Chloride    101  ()  mmol/L


 


Carbon Dioxide    26  (22-30)  mmol/L


 


Anion Gap    14  mmol/L


 


BUN    17  (7-17)  mg/dL


 


Creatinine    0.70  (0.52-1.04)  mg/dL


 


Est GFR (CKD-EPI)AfAm    >90  (>60 ml/min/1.73 sqM)  


 


Est GFR (CKD-EPI)NonAf    86  (>60 ml/min/1.73 sqM)  


 


Glucose    104 H  (74-99)  mg/dL


 


POC Glucose (mg/dL)     (75-99)  mg/dL


 


POC Glu Operater ID     


 


Plasma Lactic Acid Sanchez     (0.7-2.0)  mmol/L


 


Calcium    8.9  (8.4-10.2)  mg/dL


 


Magnesium    1.8  (1.6-2.3)  mg/dL


 


Total Bilirubin    0.7  (0.2-1.3)  mg/dL


 


AST    24  (14-36)  U/L


 


ALT    29  (9-52)  U/L


 


Alkaline Phosphatase    74  ()  U/L


 


Total Creatine Kinase  29 L    ()  U/L


 


CK-MB (CK-2)  1.4    (0.0-2.4)  ng/mL


 


CK-MB (CK-2) Rel Index  4.8    


 


Troponin I  0.017    (0.000-0.034)  ng/mL


 


Total Protein    6.7  (6.3-8.2)  g/dL


 


Albumin    3.9  (3.5-5.0)  g/dL


 


Urine Color     


 


Urine Appearance     (Clear)  


 


Urine pH     (5.0-8.0)  


 


Ur Specific Gravity     (1.001-1.035)  


 


Urine Protein     (Negative)  


 


Urine Glucose (UA)     (Negative)  


 


Urine Ketones     (Negative)  


 


Urine Blood     (Negative)  


 


Urine Nitrite     (Negative)  


 


Urine Bilirubin     (Negative)  


 


Urine Urobilinogen     (<2.0)  mg/dL


 


Ur Leukocyte Esterase     (Negative)  


 


Urine RBC     (0-5)  /hpf


 


Urine WBC     (0-5)  /hpf


 


Ur Squamous Epith Cells     (0-4)  /hpf


 


Urine Bacteria     (None)  /hpf


 


Urine Mucus     (None)  /hpf


 


Stool Occult Blood     (Negative)  














  06/03/18 06/03/18 06/03/18 Range/Units





  07:05 07:05 07:06 


 


WBC     (3.8-10.6)  k/uL


 


RBC     (3.80-5.40)  m/uL


 


Hgb     (11.4-16.0)  gm/dL


 


Hct     (34.0-46.0)  %


 


MCV     (80.0-100.0)  fL


 


MCH     (25.0-35.0)  pg


 


MCHC     (31.0-37.0)  g/dL


 


RDW     (11.5-15.5)  %


 


Plt Count     (150-450)  k/uL


 


Neutrophils %     %


 


Lymphocytes %     %


 


Monocytes %     %


 


Eosinophils %     %


 


Basophils %     %


 


Neutrophils #     (1.3-7.7)  k/uL


 


Lymphocytes #     (1.0-4.8)  k/uL


 


Monocytes #     (0-1.0)  k/uL


 


Eosinophils #     (0-0.7)  k/uL


 


Basophils #     (0-0.2)  k/uL


 


PT   10.5   (9.0-12.0)  sec


 


INR   1.1   (<1.2)  


 


APTT   23.9   (22.0-30.0)  sec


 


Sodium     (137-145)  mmol/L


 


Potassium     (3.5-5.1)  mmol/L


 


Chloride     ()  mmol/L


 


Carbon Dioxide     (22-30)  mmol/L


 


Anion Gap     mmol/L


 


BUN     (7-17)  mg/dL


 


Creatinine     (0.52-1.04)  mg/dL


 


Est GFR (CKD-EPI)AfAm     (>60 ml/min/1.73 sqM)  


 


Est GFR (CKD-EPI)NonAf     (>60 ml/min/1.73 sqM)  


 


Glucose     (74-99)  mg/dL


 


POC Glucose (mg/dL)    115 H  (75-99)  mg/dL


 


POC Glu Operater ID    Briseida Reich  


 


Plasma Lactic Acid Sanchez  1.4    (0.7-2.0)  mmol/L


 


Calcium     (8.4-10.2)  mg/dL


 


Magnesium     (1.6-2.3)  mg/dL


 


Total Bilirubin     (0.2-1.3)  mg/dL


 


AST     (14-36)  U/L


 


ALT     (9-52)  U/L


 


Alkaline Phosphatase     ()  U/L


 


Total Creatine Kinase     ()  U/L


 


CK-MB (CK-2)     (0.0-2.4)  ng/mL


 


CK-MB (CK-2) Rel Index     


 


Troponin I     (0.000-0.034)  ng/mL


 


Total Protein     (6.3-8.2)  g/dL


 


Albumin     (3.5-5.0)  g/dL


 


Urine Color     


 


Urine Appearance     (Clear)  


 


Urine pH     (5.0-8.0)  


 


Ur Specific Gravity     (1.001-1.035)  


 


Urine Protein     (Negative)  


 


Urine Glucose (UA)     (Negative)  


 


Urine Ketones     (Negative)  


 


Urine Blood     (Negative)  


 


Urine Nitrite     (Negative)  


 


Urine Bilirubin     (Negative)  


 


Urine Urobilinogen     (<2.0)  mg/dL


 


Ur Leukocyte Esterase     (Negative)  


 


Urine RBC     (0-5)  /hpf


 


Urine WBC     (0-5)  /hpf


 


Ur Squamous Epith Cells     (0-4)  /hpf


 


Urine Bacteria     (None)  /hpf


 


Urine Mucus     (None)  /hpf


 


Stool Occult Blood     (Negative)  














  06/03/18 06/03/18 Range/Units





  07:33 07:40 


 


WBC    (3.8-10.6)  k/uL


 


RBC    (3.80-5.40)  m/uL


 


Hgb    (11.4-16.0)  gm/dL


 


Hct    (34.0-46.0)  %


 


MCV    (80.0-100.0)  fL


 


MCH    (25.0-35.0)  pg


 


MCHC    (31.0-37.0)  g/dL


 


RDW    (11.5-15.5)  %


 


Plt Count    (150-450)  k/uL


 


Neutrophils %    %


 


Lymphocytes %    %


 


Monocytes %    %


 


Eosinophils %    %


 


Basophils %    %


 


Neutrophils #    (1.3-7.7)  k/uL


 


Lymphocytes #    (1.0-4.8)  k/uL


 


Monocytes #    (0-1.0)  k/uL


 


Eosinophils #    (0-0.7)  k/uL


 


Basophils #    (0-0.2)  k/uL


 


PT    (9.0-12.0)  sec


 


INR    (<1.2)  


 


APTT    (22.0-30.0)  sec


 


Sodium    (137-145)  mmol/L


 


Potassium    (3.5-5.1)  mmol/L


 


Chloride    ()  mmol/L


 


Carbon Dioxide    (22-30)  mmol/L


 


Anion Gap    mmol/L


 


BUN    (7-17)  mg/dL


 


Creatinine    (0.52-1.04)  mg/dL


 


Est GFR (CKD-EPI)AfAm    (>60 ml/min/1.73 sqM)  


 


Est GFR (CKD-EPI)NonAf    (>60 ml/min/1.73 sqM)  


 


Glucose    (74-99)  mg/dL


 


POC Glucose (mg/dL)    (75-99)  mg/dL


 


POC Glu Operater ID    


 


Plasma Lactic Acid Sanchez    (0.7-2.0)  mmol/L


 


Calcium    (8.4-10.2)  mg/dL


 


Magnesium    (1.6-2.3)  mg/dL


 


Total Bilirubin    (0.2-1.3)  mg/dL


 


AST    (14-36)  U/L


 


ALT    (9-52)  U/L


 


Alkaline Phosphatase    ()  U/L


 


Total Creatine Kinase    ()  U/L


 


CK-MB (CK-2)    (0.0-2.4)  ng/mL


 


CK-MB (CK-2) Rel Index    


 


Troponin I    (0.000-0.034)  ng/mL


 


Total Protein    (6.3-8.2)  g/dL


 


Albumin    (3.5-5.0)  g/dL


 


Urine Color   Yellow  


 


Urine Appearance   Cloudy H  (Clear)  


 


Urine pH   6.0  (5.0-8.0)  


 


Ur Specific Gravity   1.015  (1.001-1.035)  


 


Urine Protein   Trace H  (Negative)  


 


Urine Glucose (UA)   Negative  (Negative)  


 


Urine Ketones   Negative  (Negative)  


 


Urine Blood   Small H  (Negative)  


 


Urine Nitrite   Positive H  (Negative)  


 


Urine Bilirubin   Negative  (Negative)  


 


Urine Urobilinogen   <2.0  (<2.0)  mg/dL


 


Ur Leukocyte Esterase   Moderate H  (Negative)  


 


Urine RBC   7 H  (0-5)  /hpf


 


Urine WBC   68 H  (0-5)  /hpf


 


Ur Squamous Epith Cells   3  (0-4)  /hpf


 


Urine Bacteria   Many H  (None)  /hpf


 


Urine Mucus   Rare H  (None)  /hpf


 


Stool Occult Blood  Negative   (Negative)  














Disposition


Clinical Impression: 


 Diarrhea, Urinary tract infection





Disposition: HOME SELF-CARE


Condition: Good


Instructions:  Urinary Tract Infection in Women (ED), Acute Diarrhea (ED)


Prescriptions: 


Sulfamethox-Tmp 800-160Mg [Bactrim -160 mg] 1 each PO Q12HR #14 tab


Is patient prescribed a controlled substance at d/c from ED?: No


Referrals: 


Edwin Chase MD [Primary Care Provider] - 1-2 days


Time of Disposition: 08:52